# Patient Record
Sex: MALE | Race: WHITE | NOT HISPANIC OR LATINO | Employment: OTHER | URBAN - METROPOLITAN AREA
[De-identification: names, ages, dates, MRNs, and addresses within clinical notes are randomized per-mention and may not be internally consistent; named-entity substitution may affect disease eponyms.]

---

## 2018-05-17 ENCOUNTER — OFFICE VISIT CONVERTED (OUTPATIENT)
Dept: ONCOLOGY | Facility: HOSPITAL | Age: 28
End: 2018-05-17
Attending: INTERNAL MEDICINE

## 2018-06-07 ENCOUNTER — OFFICE VISIT CONVERTED (OUTPATIENT)
Dept: ONCOLOGY | Facility: HOSPITAL | Age: 28
End: 2018-06-07
Attending: INTERNAL MEDICINE

## 2020-01-17 ENCOUNTER — HOSPITAL ENCOUNTER (OUTPATIENT)
Dept: OTHER | Facility: HOSPITAL | Age: 30
Discharge: HOME | End: 2020-01-17

## 2020-01-17 LAB
ALBUMIN SERPL-MCNC: 3.8 G/DL (ref 3.5–5)
ALBUMIN/GLOB SERPL: 1.3 {RATIO} (ref 1.4–2.6)
ALP SERPL-CCNC: 128 U/L (ref 53–128)
ALT SERPL-CCNC: 74 U/L (ref 10–40)
ANION GAP SERPL CALC-SCNC: 17 MMOL/L (ref 8–19)
AST SERPL-CCNC: 40 U/L (ref 15–50)
BASOPHILS # BLD AUTO: 0.05 10*3/UL (ref 0–0.2)
BASOPHILS NFR BLD AUTO: 0.6 % (ref 0–3)
BILIRUB SERPL-MCNC: 0.24 MG/DL (ref 0.2–1.3)
BUN SERPL-MCNC: 6 MG/DL (ref 5–25)
BUN/CREAT SERPL: 9 {RATIO} (ref 6–20)
CALCIUM SERPL-MCNC: 9 MG/DL (ref 8.7–10.4)
CHLORIDE SERPL-SCNC: 97 MMOL/L (ref 99–111)
CHOLEST SERPL-MCNC: 118 MG/DL (ref 107–200)
CHOLEST/HDLC SERPL: 3.5 {RATIO} (ref 3–6)
CONV ABS IMM GRAN: 0.04 10*3/UL (ref 0–0.2)
CONV CO2: 23 MMOL/L (ref 22–32)
CONV IMMATURE GRAN: 0.4 % (ref 0–1.8)
CONV TOTAL PROTEIN: 6.7 G/DL (ref 6.3–8.2)
CREAT UR-MCNC: 0.65 MG/DL (ref 0.7–1.2)
DEPRECATED RDW RBC AUTO: 37.8 FL (ref 35.1–43.9)
EOSINOPHIL # BLD AUTO: 0.15 10*3/UL (ref 0–0.7)
EOSINOPHIL # BLD AUTO: 1.7 % (ref 0–7)
ERYTHROCYTE [DISTWIDTH] IN BLOOD BY AUTOMATED COUNT: 12.5 % (ref 11.6–14.4)
EST. AVERAGE GLUCOSE BLD GHB EST-MCNC: 223 MG/DL
GFR SERPLBLD BASED ON 1.73 SQ M-ARVRAT: >60 ML/MIN/{1.73_M2}
GLOBULIN UR ELPH-MCNC: 2.9 G/DL (ref 2–3.5)
GLUCOSE SERPL-MCNC: 211 MG/DL (ref 70–99)
HBA1C MFR BLD: 9.4 % (ref 3.5–5.7)
HCT VFR BLD AUTO: 45.8 % (ref 42–52)
HDLC SERPL-MCNC: 34 MG/DL (ref 40–60)
HGB BLD-MCNC: 15 G/DL (ref 14–18)
LDLC SERPL CALC-MCNC: 57 MG/DL (ref 70–100)
LYMPHOCYTES # BLD AUTO: 2.43 10*3/UL (ref 1–5)
LYMPHOCYTES NFR BLD AUTO: 27 % (ref 20–45)
MCH RBC QN AUTO: 27 PG (ref 27–31)
MCHC RBC AUTO-ENTMCNC: 32.8 G/DL (ref 33–37)
MCV RBC AUTO: 82.4 FL (ref 80–96)
MONOCYTES # BLD AUTO: 0.63 10*3/UL (ref 0.2–1.2)
MONOCYTES NFR BLD AUTO: 7 % (ref 3–10)
NEUTROPHILS # BLD AUTO: 5.71 10*3/UL (ref 2–8)
NEUTROPHILS NFR BLD AUTO: 63.3 % (ref 30–85)
NRBC CBCN: 0 % (ref 0–0.7)
OSMOLALITY SERPL CALC.SUM OF ELEC: 282 MOSM/KG (ref 273–304)
PLATELET # BLD AUTO: 260 10*3/UL (ref 130–400)
PMV BLD AUTO: 11.4 FL (ref 9.4–12.4)
POTASSIUM SERPL-SCNC: 3.4 MMOL/L (ref 3.5–5.3)
RBC # BLD AUTO: 5.56 10*6/UL (ref 4.7–6.1)
SODIUM SERPL-SCNC: 134 MMOL/L (ref 135–147)
TRIGL SERPL-MCNC: 135 MG/DL (ref 40–150)
VLDLC SERPL-MCNC: 27 MG/DL (ref 5–37)
WBC # BLD AUTO: 9.01 10*3/UL (ref 4.8–10.8)

## 2021-05-28 VITALS
BODY MASS INDEX: 49.44 KG/M2 | HEART RATE: 92 BPM | HEART RATE: 93 BPM | WEIGHT: 315 LBS | TEMPERATURE: 99 F | HEIGHT: 67 IN | TEMPERATURE: 99 F | BODY MASS INDEX: 49.44 KG/M2 | OXYGEN SATURATION: 96 % | SYSTOLIC BLOOD PRESSURE: 115 MMHG | HEIGHT: 67 IN | DIASTOLIC BLOOD PRESSURE: 72 MMHG | RESPIRATION RATE: 20 BRPM | WEIGHT: 315 LBS

## 2021-05-28 NOTE — PROGRESS NOTES
Patient: SHERI RYAN     Acct: [id number]     Report: #[id number]  UNIT #: [id number]     : 1990    Encounter Date:2018  PRIMARY CARE: Harlan Tiwari  ***Signed***  --------------------------------------------------------------------------------------------------------------------  NURSE INTAKE      Encounter Date      2018            Providers      Referring Provider:  Harlan Tiwari      Primary Provider:  Harlan Tiwari            Visit Type      Established Patient Visit            Chief Complaint      THROMBOCYTOPENIA            Allergies      Coded Allergies:             ARIPIPRAZOLE (Verified  Allergy, Mild, SLEEP DEPRIVATION, INTENSIFY     PROBLEMS, 18)           DIVALPROEX SODIUM (Verified  Allergy, Mild, AFFECTS LIVER, 18)            Medications      Last Reconciled on 18 10:25 by AMRIK CAIN MD      Olanzapine (Olanzapine) 10 Mg Tablet      10 MG PO QDAY for 30 Days, #30 TAB         Reported         18       LORazepam (LORazepam) 0.5 Mg Tablet      1 MG PO Q6H, TAB         Reported         18       Ziprasidone Hcl (Ziprasidone) 20 Mg Capsule      20 MG PO, CAP         Reported         18       Canagliflozin (INVOKANA) 100 Mg Tab      100 MG PO QDAY, #30 TAB 0 Refills         Reported         18       Insulin Degludec (Tresiba Flextouch U-100) 100 Unit/1 Ml Insuln.pen      10 UNITS SUBQ QDAY for 30 Days, #1 INSULN.PEN         Reported         18       Metformin Hcl (Metformin Hcl*) 500 Mg Tablet      2 PO BID, TAB         Reported         13       Lisinopril* (Lisinopril*) 20 Mg Tablet      1 TAB PO, TAB         Reported         13       hydrOXYzine HCL (Atarax*) 25 Mg Tablet      1 PO, TAB         Reported         13       fluvoxaMINE Maleate (fluvoxaMINE) 100 Mg Tablet      2 PO, TAB         Reported         13       fluvoxaMINE Maleate (fluvoxaMINE) 100 Mg Tablet      1 TAB PO, TAB          Reported         12/12/13       (Burpropion Hcl)   No Conflict Check      100 MG QDAY         Reported         12/12/13            PAST, FAMILY   Past Medical History      Past Medical History:  No Diabetes Type 1, Yes Diabetes Type 2, No Thyroid     Disease, No COPD, No Emphysema, Yes Hypertension, No Stroke, No High Cholesterol    , No Heart Attack, No Bleeding Condition, No Low or High RBC Count, No Low or     High WBC Count, No Low or High Platelet Coun, No Hepatitis, No Kidney Disease,     Yes Depression, No Alzheimer's Disease, Yes Mental Disease (AUTISTIC), No     Seizures, No Arthritis, No Osteoporosis, No Osteopenia, No Short of Air, No     Sleep apnea, No Liver Disease, No STD, No Enlarged Prostate, No Other      Hematology/oncology:  REPORTS HX OF: Brain cancer ([employer] GRAND MOTHER), Breast     cancer ([facility]ER[employer] AUNT), Lymphoma (GRAND FATHER), Other hematologic history,     DENIES HX OF: Previous Treatment for CA, Anemia, Bladder Cancer, Blood cancer,     Coagulopathy, Colon Cancer, Colorectal cancer, Endocrine cancer, Eye cancer, GI     cancer,  cancer, Kidney cancer, Leukemia, Leukocytosis, Leukopenia, Liver     cancer, Lung cancer, Musculoskeletal cancer, Myeloma, Neurologic cancer, Oral     cancer, Pancreatic Cancer, Prostate cancer, Skin cancer, Stomach cancer,     Testicular cancer, Thrombocytopenia, Thyroid cancer, Other cancer history      Genetic/metabolic:  DENIES HX OF: Cystic fibrosis, Down syndrome, Other genetic     history, Other metabolic history            Past Surgical History      DENIES HX OF: Cataract extraction, Thyroid surgery, Lung biopsy, CABG surgery,     Coronary stent, Valve replacement, Appendectomy, Cholecystectomy, Splenectomy,     Bladder surgery, Nephrectomy, Joint replacement, Frature repair, Skin cancer     removal, Melanoma excision, Spinal surgery, Breast biopsy, Lumpectomy,     Mastectomy, bilateral, Mastectomy, right, Mastectomy, left, Hysterectomy, Peg     Tube  Placement, VAD Placement, Biopsy, Other Past Surgical Hx            Family History      DENIES HX OF: Anemia, Blood disorders, Blood Cancer, Breast cancer, Cervical     cancer, Coagulopathy, Colorectal cancer, Endocrine Cancer, Eye Cancer, GI Cancer    ,  Cancer, Kidney Cancer, Leukemia, Leukocytosis, Leukopenia, Liver Cancer,     Lung cancer, Lymphoma, Melanoma, Musculoskeletal Cancer, Myeloma, Neurologic     Cancer, Oral Cancer, Ovarian cancer, Prostate cancer, Skin Cancer, Stomach     Cancer, Testicular Cancer, Thrombocytopenia, Thyroid cancer, Uterine cancer,     Other Cancer History, Other Hematology History            Social History      Lives independently:  No            Tobacco Use      Tobacco status:  Never smoker            Alcohol Use      Alcohol intake:  None      Counseling given:  None            Substance Use      Substance use:  Denies use            HISTORY OF PRESENT ILLNESS      Interim History            Mr. Tierney is a very pleasant 28-year-old gentleman who is being referred to us     for recently diagnosed thrombocytopenia. Patient has the diagnosis of autism,     even though he is at the high functioning level. He is accompanied by both of     his parents today. His most recent CBC which was from March shows white count     of 8.9, hemoglobin 16.2 and a platelet count of 41,000. His platelet count was     previously 1 21,001 year ago in 2017 and it was normal prior to that above 200,    000. I reviewed all of his medications, he is taking multiple medications for     his poorly controlled diabetes.       The recent changes to his medication include invokana and ziprasidone. Patient     denies any significant bleeding or bruising at this point. Rest of his     chemistries were all within normal limits.      Patient comes after having a repeat lab work.            Most Recent Lab Findings      Laboratory Tests      5/17/18 12:23            Laboratory Tests            Test        5/17/18       12:23             Ferritin        90 ng/mL      ()            REVIEW OF SYSTEMS      General:  Denies: Appetite change, Excessive sweating, Fatigue, Fever, Night     sweats, Weight gain, Weight loss, Other      Eyes:  Denies: Blurred vision, Corrective lenses, Diplopia, Eye irritation, Eye     pain, Eye redness, Spots in vision, Vision loss, Other      Ears, nose, mouth, throat:  Denies: Headache, Seizures, Visual Changes, Hearing     loss, Sinus Congestion, Hoarseness, Sore throat, Other      Cardiovascular:  Denies: Chest pain, Irregular heartbeat, Palpitations, Swollen     ankles/legs, Other      Respiratory:  Denies: Chest pain, Shortness of Air, Productive cough, Coughing     blood, Other      Gastrointestinal:  Denies: Nausea, Vomiting, Problem swallowing, Frequent     heartburn, Constipation, Diarrhea, Tarry stools, Bloody stools, Unable to     control bowels, Other      Kidney/Bladder:  Denies: Painful Urination, Blood in Urine, Incontinence,     Frequent Urination, Decreased Urine Stream, Other      Musculoskeletal:  Denies: New Back pain, Leg Cramps, Painful Joints, Swollen     Joints, Muscle Pain, Muscle weakness, Other      Skin:  DENIES: Bruises Easily, Hair changes, Lesions/changes in moles, Nail     changes, Pigment changes, Rash, Other      Neurological:  Denies: Dizziness, Fainting, Numbness\Tingling, Paralysis,     Seizures, Other      Psychiatric:  Complains of: AAO X 3, Denies: Anxiety, Panic attacks, Depression    , Memory loss, Other      Endocrine:  DiabetesThyroid DisorderOsteoporosisEndocrine Other      Hematologic/lymphatic:  Denies: Bruising, Bleeding, Enlarged Lymph Nodes,     Recurrent infections, Other            VITAL SIGNS AND SCORES      Vitals      Height 5 ft 6.65 in / 169.3 cm      Weight 339 lbs 8.134 oz / 154.0 kg      BSA 2.79 m2      BMI 53.7 kg/m2      Temperature 99.0 F / 37.22 C - Temporal      Pulse 93      Blood Pressure 115/72 Sitting, Left Arm      Pulse  Oximetry 96%, ROOM AIR            Pain Score      Pain Assessment:           Experiencing any pain?:  No         Pain Scale Used:  Numerical         Pain Intensity:  0            Fatigue Score               Experiencing any fatigue?:  No         Fatigue (0-10 scale):  0 (none)            EXAM      Vitals            Vital Signs              Date Time  Temp Pulse Resp B/P (MAP) Pulse Ox O2 Delivery O2 Flow Rate FiO2             5/17/18 11:17 99 92 20                 General Appearance:  Alert, Oriented X3, Cooperative      Eyes:  Anicteric Sclerae, Moist Conjunctiva      HEENT:  Orophraynx clear, No Erythema, No Exudates      Neck:  Supple, Full ROM      Respiratory:  CTAB, No Diminished Breath      Abdomen\Gastro:  Soft, No NABS      Cardio:  RRR, No Murmur, No, Peripheral Edema      Skin:  Normal Temperature, No Induration      Psychiatric:  Appropriate Affect, Intact Judgement, AAO x3      Muscularskeletal:  Normal Gait and Station, Full ROM of extremeties      Lymphatic:  No Cervical, No Supraclavicular, No Infraclavicular, No Axillary,     No Inguinal, No Other            PREVENTION      Hx Influenza Vaccination:  No      Influenza Vaccine Declined:  No      2 or More Falls Past Year?:  No      Fall Past Year with Injury?:  No      Hx Pneumococcal Vaccination:  No      Encouraged to follow-up with:  PCP regarding preventative exams.      Chart initiated by      MALIA ZAMBRANO CMA            IMPRESSION/PLAN      Current Plan            Mr. Tierney is a very pleasant 28-year-old gentleman with newly diagnosed     thrombocytopenia. It is most likely related to medication because he was     recently started on Ziprasidone which could potentially be contributing to his     thrombocytopenia.      His repeat lab work is completely normal from hematology standpoint, white     count 9.9, hemoglobin 16 and platelet count 370K. His thrombocytopenia was     transient and has completely resolved.      Patient does not need any  further intervention or further follow-up in     hematology but we will be certainly available if needed.            Patient Education      Patient Education Provided:  Yes                 Disclaimer: Converted document may not contain table formatting or lab diagrams. Please see [employer] for the authenticated document.

## 2021-05-28 NOTE — PROGRESS NOTES
Patient: SHERI RYAN     Acct: [id number]     Report: #[id number]  UNIT #: [id number]     : 1990    Encounter Date:2018  PRIMARY CARE: Harlan Tiwari  ***Signed***  --------------------------------------------------------------------------------------------------------------------  NURSE INTAKE      Encounter Date      May 17, 2018            Providers      Referring Provider:  Harlan Tiwari      Primary Provider:  Harlan Tiwari            Visit Type      New Patient Visit            Chief Complaint      THROMBOCYTOPENIA            Allergies      Coded Allergies:             ARIPIPRAZOLE (Verified  Allergy, Mild, SLEEP DEPRIVATION, INTENSIFY     PROBLEMS, 18)           DIVALPROEX SODIUM (Verified  Allergy, Mild, AFFECTS LIVER, 18)            Medications      Last Reconciled on 18 16:03 by AMRIK CAIN MD      Olanzapine (Olanzapine) 10 Mg Tablet      10 MG PO QDAY for 30 Days, #30 TAB         Reported         18       LORazepam (LORazepam) 0.5 Mg Tablet      1 MG PO Q6H, TAB         Reported         18       Ziprasidone Hcl (Ziprasidone) 20 Mg Capsule      20 MG PO, CAP         Reported         18       Canagliflozin (INVOKANA) 100 Mg Tab      100 MG PO QDAY, #30 TAB 0 Refills         Reported         18       Insulin Degludec (Tresiba Flextouch U-100) 100 Unit/1 Ml Insuln.pen      10 UNITS SUBQ QDAY for 30 Days, #1 INSULN.PEN         Reported         18       Metformin Hcl (Metformin Hcl*) 500 Mg Tablet      2 PO BID, TAB         Reported         13       Lisinopril* (Lisinopril*) 20 Mg Tablet      1 TAB PO, TAB         Reported         13       sitaGLIPtin Phosphate (Januvia *) 100 Mg Tablet      1 TAB PO QDAY, TAB         Reported         13       hydrOXYzine HCL (Atarax*) 25 Mg Tablet      1 PO, TAB         Reported         13       Glimepiride (Glimepiride*) 2 Mg Tablet      1 TAB PO QDAY, TAB          Reported         12/12/13       fluvoxaMINE Maleate (fluvoxaMINE) 100 Mg Tablet      2 PO, TAB         Reported         12/12/13       fluvoxaMINE Maleate (fluvoxaMINE) 100 Mg Tablet      1 TAB PO, TAB         Reported         12/12/13       (Burpropion Hcl)   No Conflict Check      100 MG QDAY         Reported         12/12/13            PAST, FAMILY   Past Medical History      Past Medical History:  Yes Diabetes Type 2, Yes Hypertension, Yes Depression,     Yes Mental Disease (AUTISTIC)      Hematology/oncology:  REPORTS HX OF: Brain cancer ([employer] GR[employer] MOTHER), Breast     cancer (MATERNAL AUNT), Lymphoma (GRAND FATHER), Other hematologic history,     DENIES HX OF: Other cancer history            Social History      Lives independently:  No            Tobacco Use      Tobacco status:  Never smoker            Alcohol Use      Alcohol intake:  None      Counseling given:  None            Substance Use      Substance use:  Denies use            HISTORY OF PRESENT ILLNESS      History and Physical            Mr. Tierney is a very pleasant 28-year-old gentleman who is being referred to us     for recently diagnosed thrombocytopenia. Patient has the diagnosis of autism,     even though he is at the high functioning level. He is accompanied by both of     his parents today. His most recent CBC which was from March shows white count     of 8.9, hemoglobin 16.2 and a platelet count of 41,000. His platelet count was     previously 1 21,001 year ago in 2017 and it was normal prior to that above 200,    000. I reviewed all of his medications, he is taking multiple medications for     his poorly controlled diabetes. The recent changes to his medication include     invokana and ziprasidone. Patient denies any significant bleeding or bruising     at this point. Rest of his chemistries were all within normal limits.            REVIEW OF SYSTEMS      General:  Complains of: Weight gain, Denies: Appetite change, Excessive sweating     , Fatigue, Fever, Night sweats, Weight loss, Other      Eyes:  Denies: Blurred vision, Corrective lenses, Diplopia, Eye irritation, Eye     pain, Eye redness, Spots in vision, Vision loss, Other      Ears, nose, mouth, throat:  Denies: Headache, Seizures, Visual Changes, Hearing     loss, Sinus Congestion, Hoarseness, Sore throat, Other      Cardiovascular:  Denies: Chest pain, Irregular heartbeat, Palpitations, Swollen     ankles/legs, Other      Respiratory:  Denies: Chest pain, Shortness of Air, Productive cough, Coughing     blood, Other      Gastrointestinal:  Denies: Nausea, Vomiting, Problem swallowing, Frequent     heartburn, Constipation, Diarrhea, Tarry stools, Bloody stools, Unable to     control bowels, Other      Kidney/Bladder:  Denies: Painful Urination, Blood in Urine, Incontinence,     Frequent Urination, Decreased Urine Stream, Other      Musculoskeletal:  Complains of: Swollen Joints, Denies: New Back pain, Leg     Cramps, Painful Joints, Muscle Pain, Muscle weakness, Other      Skin:  DENIES: Bruises Easily, Hair changes, Lesions/changes in moles, Nail     changes, Pigment changes, Rash, Other      Neurological:  Denies: Dizziness, Fainting, Numbness\Tingling, Paralysis,     Seizures, Other      Psychiatric:  Complains of: Anxiety, AAO X 3, Depression, Denies: Panic attacks    , Memory loss, Other      Endocrine:  DiabetesThyroid DisorderOsteoporosisEndocrine Other      Hematologic/lymphatic:  Denies: Bruising, Bleeding, Enlarged Lymph Nodes,     Recurrent infections, Other            VITAL SIGNS AND SCORES      Vitals      Height 5 ft 6.65 in / 169.3 cm      Weight 346 lbs 12.537 oz / 157.3 kg      BSA 2.82 m2      BMI 54.9 kg/m2      Temperature 99 F / 37.22 C - Temporal      Pulse 92      Respirations 20            Pain Score      Pain Assessment:           Experiencing any pain?:  No         Pain Scale Used:  Numerical         Pain Intensity:  0            Fatigue Score                Experiencing any fatigue?:  No         Fatigue (0-10 scale):  0 (none)            EXAM      General Appearance:  Alert, Oriented X3, Cooperative      Eyes:  Anicteric Sclerae, Moist Conjunctiva      HEENT:  Orophraynx clear, No Erythema, No Exudates      Neck:  Supple, Full ROM      Respiratory:  CTAB, No Diminished Breath      Abdomen\Gastro:  Soft, No NABS      Cardio:  RRR, No Murmur, No, Peripheral Edema      Skin:  Normal Temperature, No Induration      Psychiatric:  Appropriate Affect, Intact Judgement, AAO x3      Muscularskeletal:  Normal Gait and Station, Full ROM of extremeties      Lymphatic:  No Cervical, No Supraclavicular, No Infraclavicular, No Axillary,     No Inguinal, No Other            PREVENTION      Hx Influenza Vaccination:  No      2 or More Falls Past Year?:  No      Fall Past Year with Injury?:  No      Hx Pneumococcal Vaccination:  No      Encouraged to follow-up with:  PCP regarding preventative exams.      Chart initiated by      FABIÁN LORA MA            IMPRESSION/PLAN      Current Plan            Mr. Tierney is a very pleasant 28-year-old gentleman with newly diagnosed     thrombocytopenia. It is most likely related to medication because he was     recently started on Ziprasidone which could potentially be contributing to his     thrombocytopenia.      I had a long discussion with the patient and his family, they're going to     discuss with his psychiatrist about any other I medication. We are going to     repeat the CBC today to get the baseline platelet count and to make sure he     does not have progressive decline. I do not think at the moment we need to     change the medication unless there is progressive decline in his platelet     count. We will like to rule out other etiologies as well including vitamin     levels. Patient and his family had number of questions which were answered to     their satisfaction I also spent significant time discussing his diabetes     management.             Plan      Thrombocytopenia - D69.6            Notes      New Medications      * INSULIN DEGLUDEC (Tresiba Flextouch U-100) 100 UNIT/1 ML INSULN.PEN: 10 UNITS     SUBQ QDAY 30 Days #1      * CANAGLIFLOZIN (INVOKANA) 100 MG TAB: 100 MG PO QDAY #30       Instructions: Take before breakfast.      * ZIPRASIDONE HCL (Ziprasidone) 20 MG CAPSULE: 20 MG PO      * LORazepam 0.5 MG TABLET: 1 MG PO Q6H      * Olanzapine 10 MG TABLET: 10 MG PO QDAY 30 Days #30      New Diagnostics      * CBC, Stat       Dx: Thrombocytopenia - D69.6      * Comp Metabolic Panel, Stat      * B12   * LDH, Stat      * Reticulocyte Count, Stat      * Iron Profile, Stat      * Haptoglobin, Stat      * Ferritin Level, Stat            Patient Education      Patient Education Provided:  Yes                 Disclaimer: Converted document may not contain table formatting or lab diagrams. Please see [employer] for the authenticated document.

## 2021-08-28 PROCEDURE — U0003 INFECTIOUS AGENT DETECTION BY NUCLEIC ACID (DNA OR RNA); SEVERE ACUTE RESPIRATORY SYNDROME CORONAVIRUS 2 (SARS-COV-2) (CORONAVIRUS DISEASE [COVID-19]), AMPLIFIED PROBE TECHNIQUE, MAKING USE OF HIGH THROUGHPUT TECHNOLOGIES AS DESCRIBED BY CMS-2020-01-R: HCPCS | Performed by: EMERGENCY MEDICINE
